# Patient Record
Sex: FEMALE | Race: WHITE | NOT HISPANIC OR LATINO
[De-identification: names, ages, dates, MRNs, and addresses within clinical notes are randomized per-mention and may not be internally consistent; named-entity substitution may affect disease eponyms.]

---

## 2017-06-08 ENCOUNTER — MEDICATION RENEWAL (OUTPATIENT)
Age: 42
End: 2017-06-08

## 2017-09-15 ENCOUNTER — MEDICATION RENEWAL (OUTPATIENT)
Age: 42
End: 2017-09-15

## 2017-11-02 ENCOUNTER — APPOINTMENT (OUTPATIENT)
Dept: OBGYN | Facility: CLINIC | Age: 42
End: 2017-11-02

## 2017-11-27 ENCOUNTER — RX RENEWAL (OUTPATIENT)
Age: 42
End: 2017-11-27

## 2018-01-03 ENCOUNTER — APPOINTMENT (OUTPATIENT)
Dept: OBGYN | Facility: CLINIC | Age: 43
End: 2018-01-03
Payer: COMMERCIAL

## 2018-01-03 VITALS
SYSTOLIC BLOOD PRESSURE: 110 MMHG | HEIGHT: 62 IN | WEIGHT: 146 LBS | DIASTOLIC BLOOD PRESSURE: 70 MMHG | BODY MASS INDEX: 26.87 KG/M2

## 2018-01-03 PROCEDURE — 99396 PREV VISIT EST AGE 40-64: CPT

## 2018-01-04 ENCOUNTER — MOBILE ON CALL (OUTPATIENT)
Age: 43
End: 2018-01-04

## 2018-01-10 LAB — CYTOLOGY CVX/VAG DOC THIN PREP: NORMAL

## 2019-02-21 ENCOUNTER — RX RENEWAL (OUTPATIENT)
Age: 44
End: 2019-02-21

## 2019-03-20 ENCOUNTER — APPOINTMENT (OUTPATIENT)
Dept: OBGYN | Facility: CLINIC | Age: 44
End: 2019-03-20
Payer: COMMERCIAL

## 2019-03-20 VITALS
SYSTOLIC BLOOD PRESSURE: 110 MMHG | BODY MASS INDEX: 27.97 KG/M2 | DIASTOLIC BLOOD PRESSURE: 66 MMHG | HEIGHT: 62 IN | WEIGHT: 152 LBS

## 2019-03-20 PROCEDURE — 99396 PREV VISIT EST AGE 40-64: CPT

## 2019-03-20 NOTE — PHYSICAL EXAM
[Awake] : awake [Alert] : alert [Acute Distress] : no acute distress [LAD] : no lymphadenopathy [Thyroid Nodule] : no thyroid nodule [Goiter] : no goiter [Mass] : no breast mass [Nipple Discharge] : no nipple discharge [Axillary LAD] : no axillary lymphadenopathy [Soft] : soft [Oriented x3] : oriented to person, place, and time [Tender] : non tender [Normal] : uterus [No Bleeding] : there was no active vaginal bleeding [Uterine Adnexae] : were not tender and not enlarged [RRR, No Murmurs] : RRR, no murmurs [CTAB] : CTAB

## 2019-03-20 NOTE — PROCEDURE
[Cervical Pap Smear] : cervical Pap smear [Tolerated Well] : the patient tolerated the procedure well [Liquid Base] : liquid base [No Complications] : there were no complications

## 2019-03-25 LAB — CYTOLOGY CVX/VAG DOC THIN PREP: NORMAL

## 2019-05-06 ENCOUNTER — RX RENEWAL (OUTPATIENT)
Age: 44
End: 2019-05-06

## 2019-06-27 ENCOUNTER — MEDICATION RENEWAL (OUTPATIENT)
Age: 44
End: 2019-06-27

## 2019-07-22 ENCOUNTER — RX RENEWAL (OUTPATIENT)
Age: 44
End: 2019-07-22

## 2019-09-16 ENCOUNTER — RX RENEWAL (OUTPATIENT)
Age: 44
End: 2019-09-16

## 2019-10-21 ENCOUNTER — MEDICATION RENEWAL (OUTPATIENT)
Age: 44
End: 2019-10-21

## 2019-12-09 ENCOUNTER — RX RENEWAL (OUTPATIENT)
Age: 44
End: 2019-12-09

## 2020-02-20 ENCOUNTER — RX RENEWAL (OUTPATIENT)
Age: 45
End: 2020-02-20

## 2020-09-17 ENCOUNTER — APPOINTMENT (OUTPATIENT)
Dept: OBGYN | Facility: CLINIC | Age: 45
End: 2020-09-17
Payer: COMMERCIAL

## 2020-09-17 VITALS
BODY MASS INDEX: 28.34 KG/M2 | HEIGHT: 62 IN | TEMPERATURE: 98 F | SYSTOLIC BLOOD PRESSURE: 120 MMHG | WEIGHT: 154 LBS | DIASTOLIC BLOOD PRESSURE: 70 MMHG

## 2020-09-17 DIAGNOSIS — Z01.419 ENCOUNTER FOR GYNECOLOGICAL EXAMINATION (GENERAL) (ROUTINE) W/OUT ABNORMAL FINDINGS: ICD-10-CM

## 2020-09-17 PROCEDURE — 99396 PREV VISIT EST AGE 40-64: CPT

## 2020-09-17 NOTE — HISTORY OF PRESENT ILLNESS
[FreeTextEntry1] : patient presents today for routine annual exam.\par  [TextBox_4] : no complaints  [Mammogramdate] : 8/2020 [BreastSonogramDate] : 8/2020 [PapSmeardate] : 3/2019

## 2020-09-23 LAB — CYTOLOGY CVX/VAG DOC THIN PREP: NORMAL

## 2020-10-20 ENCOUNTER — NON-APPOINTMENT (OUTPATIENT)
Age: 45
End: 2020-10-20

## 2020-10-28 ENCOUNTER — APPOINTMENT (OUTPATIENT)
Dept: OBGYN | Facility: CLINIC | Age: 45
End: 2020-10-28
Payer: COMMERCIAL

## 2020-10-28 DIAGNOSIS — Z13.79 ENCOUNTER FOR OTHER SCREENING FOR GENETIC AND CHROMOSOMAL ANOMALIES: ICD-10-CM

## 2020-10-28 PROCEDURE — 99211 OFF/OP EST MAY X REQ PHY/QHP: CPT

## 2020-10-28 PROCEDURE — 99072 ADDL SUPL MATRL&STAF TM PHE: CPT

## 2021-02-13 ENCOUNTER — TRANSCRIPTION ENCOUNTER (OUTPATIENT)
Age: 46
End: 2021-02-13

## 2021-09-15 ENCOUNTER — RX RENEWAL (OUTPATIENT)
Age: 46
End: 2021-09-15

## 2021-09-19 ENCOUNTER — TRANSCRIPTION ENCOUNTER (OUTPATIENT)
Age: 46
End: 2021-09-19

## 2021-09-24 ENCOUNTER — TRANSCRIPTION ENCOUNTER (OUTPATIENT)
Age: 46
End: 2021-09-24

## 2021-09-30 ENCOUNTER — APPOINTMENT (OUTPATIENT)
Dept: OBGYN | Facility: CLINIC | Age: 46
End: 2021-09-30
Payer: COMMERCIAL

## 2021-09-30 VITALS
WEIGHT: 155 LBS | SYSTOLIC BLOOD PRESSURE: 108 MMHG | BODY MASS INDEX: 28.52 KG/M2 | HEIGHT: 62 IN | DIASTOLIC BLOOD PRESSURE: 66 MMHG

## 2021-09-30 PROCEDURE — 99396 PREV VISIT EST AGE 40-64: CPT

## 2021-09-30 NOTE — HISTORY OF PRESENT ILLNESS
[FreeTextEntry1] : patient presents today for routine annual exam.\par  [TextBox_4] : no complaints  [Mammogramdate] : 9/2021 [facility]  [PapSmeardate] : 9/2020 [LMPDate] : 9/27/2021

## 2021-09-30 NOTE — REVIEW OF SYSTEMS
[Breast Lump] : breast lump [Headache] : headache [Sleep Disturbances] : sleep disturbances [Negative] : Heme/Lymph

## 2021-10-01 ENCOUNTER — TRANSCRIPTION ENCOUNTER (OUTPATIENT)
Age: 46
End: 2021-10-01

## 2021-10-06 LAB — CYTOLOGY CVX/VAG DOC THIN PREP: NORMAL

## 2021-10-08 ENCOUNTER — TRANSCRIPTION ENCOUNTER (OUTPATIENT)
Age: 46
End: 2021-10-08

## 2021-10-14 ENCOUNTER — TRANSCRIPTION ENCOUNTER (OUTPATIENT)
Age: 46
End: 2021-10-14

## 2021-10-20 ENCOUNTER — TRANSCRIPTION ENCOUNTER (OUTPATIENT)
Age: 46
End: 2021-10-20

## 2021-10-27 ENCOUNTER — TRANSCRIPTION ENCOUNTER (OUTPATIENT)
Age: 46
End: 2021-10-27

## 2021-11-01 ENCOUNTER — TRANSCRIPTION ENCOUNTER (OUTPATIENT)
Age: 46
End: 2021-11-01

## 2021-12-15 ENCOUNTER — RX RENEWAL (OUTPATIENT)
Age: 46
End: 2021-12-15

## 2023-07-03 ENCOUNTER — APPOINTMENT (OUTPATIENT)
Dept: OBGYN | Facility: CLINIC | Age: 48
End: 2023-07-03
Payer: COMMERCIAL

## 2023-07-03 VITALS
DIASTOLIC BLOOD PRESSURE: 70 MMHG | HEIGHT: 62 IN | WEIGHT: 144 LBS | SYSTOLIC BLOOD PRESSURE: 108 MMHG | BODY MASS INDEX: 26.5 KG/M2

## 2023-07-03 DIAGNOSIS — Z80.3 FAMILY HISTORY OF MALIGNANT NEOPLASM OF BREAST: ICD-10-CM

## 2023-07-03 DIAGNOSIS — Z01.419 ENCOUNTER FOR GYNECOLOGICAL EXAMINATION (GENERAL) (ROUTINE) W/OUT ABNORMAL FINDINGS: ICD-10-CM

## 2023-07-03 PROCEDURE — 99396 PREV VISIT EST AGE 40-64: CPT

## 2023-07-03 RX ORDER — NORETHINDRONE AND ETHINYL ESTRADIOL 0.4-0.035
0.4-35 KIT ORAL
Qty: 1 | Refills: 3 | Status: DISCONTINUED | COMMUNITY
Start: 2017-06-08 | End: 2023-07-03

## 2023-07-03 RX ORDER — NORETHINDRONE ACETATE AND ETHINYL ESTRADIOL AND FERROUS FUMARATE 1.5-30(21)
1.5-3 KIT ORAL
Qty: 1 | Refills: 1 | Status: DISCONTINUED | COMMUNITY
Start: 2019-06-27 | End: 2023-07-03

## 2023-07-03 RX ORDER — NORETHINDRONE ACETATE AND ETHINYL ESTRADIOL AND FERROUS FUMARATE 1.5-30(21)
1.5-3 KIT ORAL
Qty: 3 | Refills: 1 | Status: DISCONTINUED | COMMUNITY
Start: 2019-06-27 | End: 2023-07-03

## 2023-07-03 RX ORDER — NORETHINDRONE ACETATE AND ETHINYL ESTRADIOL AND FERROUS FUMARATE 1.5-30(21)
1.5-3 KIT ORAL
Qty: 28 | Refills: 11 | Status: DISCONTINUED | COMMUNITY
Start: 2020-02-20 | End: 2023-07-03

## 2023-07-03 RX ORDER — NORTRIPTYLINE HYDROCHLORIDE 10 MG/1
10 CAPSULE ORAL
Refills: 0 | Status: DISCONTINUED | COMMUNITY
End: 2023-07-03

## 2023-07-03 RX ORDER — SUMATRIPTAN 50 MG/1
50 TABLET, FILM COATED ORAL
Refills: 0 | Status: DISCONTINUED | COMMUNITY
End: 2023-07-03

## 2023-07-07 PROBLEM — Z80.3 FAMILY HISTORY OF MALIGNANT NEOPLASM OF BREAST: Status: ACTIVE | Noted: 2023-07-03

## 2023-08-14 ENCOUNTER — APPOINTMENT (OUTPATIENT)
Dept: OBGYN | Facility: CLINIC | Age: 48
End: 2023-08-14

## 2023-08-14 DIAGNOSIS — Z30.9 ENCOUNTER FOR CONTRACEPTIVE MANAGEMENT, UNSPECIFIED: ICD-10-CM

## 2023-08-15 ENCOUNTER — APPOINTMENT (OUTPATIENT)
Dept: OBGYN | Facility: CLINIC | Age: 48
End: 2023-08-15
Payer: COMMERCIAL

## 2023-08-15 VITALS
DIASTOLIC BLOOD PRESSURE: 60 MMHG | BODY MASS INDEX: 25.76 KG/M2 | SYSTOLIC BLOOD PRESSURE: 118 MMHG | HEIGHT: 62 IN | WEIGHT: 140 LBS

## 2023-08-15 DIAGNOSIS — Z30.430 ENCOUNTER FOR INSERTION OF INTRAUTERINE CONTRACEPTIVE DEVICE: ICD-10-CM

## 2023-08-15 DIAGNOSIS — N92.0 EXCESSIVE AND FREQUENT MENSTRUATION WITH REGULAR CYCLE: ICD-10-CM

## 2023-08-15 PROCEDURE — 76830 TRANSVAGINAL US NON-OB: CPT

## 2023-08-15 PROCEDURE — 58300 INSERT INTRAUTERINE DEVICE: CPT

## 2023-08-15 NOTE — PLAN
[FreeTextEntry1] : Transvaginal pelvic sonogram performed prior to and after insertion of IUD confirming proper placement.

## 2023-08-15 NOTE — PROCEDURE
[IUD Placement] : intrauterine device (IUD) placement [Consent Obtained] : Consent obtained [Prevention of Pregnancy] : prevention of pregnancy [Betadine] : Betadine [Tenaculum] : Tenaculum [Required Dilation] : required dilation [Mirena IUD] : Mirena IUD [Tolerated Well] : Patient tolerated the procedure well [No Complications] : No complications [de-identified] : Menorrhagia  [de-identified] : SN: [id number] [de-identified] : [id number] [de-identified] : 5/25

## 2023-08-21 PROBLEM — Z30.430 ENCOUNTER FOR INSERTION OF MIRENA IUD: Status: ACTIVE | Noted: 2023-08-21

## 2023-08-21 PROBLEM — N92.0 MENORRHAGIA WITH REGULAR CYCLE: Status: ACTIVE | Noted: 2023-08-21

## 2024-04-27 ENCOUNTER — APPOINTMENT (OUTPATIENT)
Dept: ORTHOPEDIC SURGERY | Facility: CLINIC | Age: 49
End: 2024-04-27
Payer: COMMERCIAL

## 2024-04-27 VITALS — WEIGHT: 160 LBS | BODY MASS INDEX: 29.44 KG/M2 | HEIGHT: 62 IN

## 2024-04-27 DIAGNOSIS — S93.491A SPRAIN OF OTHER LIGAMENT OF RIGHT ANKLE, INITIAL ENCOUNTER: ICD-10-CM

## 2024-04-27 PROCEDURE — 99203 OFFICE O/P NEW LOW 30 MIN: CPT

## 2024-04-27 NOTE — PHYSICAL EXAM
[de-identified] : RIGHT ankle range of motion dorsiflexion 20 degrees, plantarflexion 40 degrees, inversion 20 degrees, eversion 15 degrees. pain with inversion. No tenderness about the medial or lateral malleolus. Ankle joint line is non tender. ATFL, CFL are TENDER. Syndesmoses is nontender with negative squeeze test. Achilles, peroneal, posterior tibialis tendons are nontender. Negative anterior drawer. Tarsals are non tender. Metatarsals are non tender. TMT, MTPs non tender. Phalanges are non tender.  Strength is 4+/5 in all planes. Patient is able to do a single heel raise with pain.  Normal alignment. No pes planus or ankle valgus.  Skin is intact without rashes, erythema, warmth. Sensation is intact to light touch. 2+ DP and PT pulses. Normal reflexes.

## 2024-04-27 NOTE — ASSESSMENT
[FreeTextEntry1] : 3 views of the right foot with no acute fracture or dislocation. discussed these are not dedicated ankle views, but medial and lateral mal appears without fracture on limited viewing.

## 2024-04-27 NOTE — HISTORY OF PRESENT ILLNESS
[6] : 6 [4] : 4 [Dull/Aching] : dull/aching [Localized] : localized [Sharp] : sharp [Throbbing] : throbbing [Standing] : standing [Walking] : walking [de-identified] : This is a 48 year old female with right ankle pain after she rolled the ankle on 4/25/24. She was seen at [facility] and wrapped in a ace wrap. pain with ambulation. no prior injury. [] : no [FreeTextEntry1] : Right ankle  [FreeTextEntry3] : 4/25/2024 [FreeTextEntry5] : Pt was walking around the car and rolled her ankle, Pt went to [facility] the [facility], wally WILSON with her  [FreeTextEntry7] : Bottom of her foot  [de-identified] : Movement  [de-identified] : 4/25/2024 [de-identified] : [facility] [de-identified] : X-ray

## 2024-04-27 NOTE — DISCUSSION/SUMMARY
[de-identified] : Ms. HILARIO STEWARD is a 48 year year old female  diagnosed with a right ankle sprain. Diagnosis was discussed and treatment options were reviewed.   She was placed in a tall CAM walker boot and can be WBAT. The importance for rest, ice and elevation was discussed. Over the counter NSAIDs can be used as needed. Physical therapy was prescribed, but she declined. She was provided with a handout on exercises.   She will follow up with a foot/ankle specialist in 2-3 weeks.   All of  her questions were answered.  She understands and agree.

## 2024-11-26 ENCOUNTER — APPOINTMENT (OUTPATIENT)
Dept: OBGYN | Facility: CLINIC | Age: 49
End: 2024-11-26
Payer: COMMERCIAL

## 2024-11-26 VITALS
SYSTOLIC BLOOD PRESSURE: 110 MMHG | BODY MASS INDEX: 30.91 KG/M2 | HEIGHT: 62 IN | WEIGHT: 168 LBS | DIASTOLIC BLOOD PRESSURE: 60 MMHG

## 2024-11-26 DIAGNOSIS — Z80.41 FAMILY HISTORY OF MALIGNANT NEOPLASM OF OVARY: ICD-10-CM

## 2024-11-26 DIAGNOSIS — Z01.419 ENCOUNTER FOR GYNECOLOGICAL EXAMINATION (GENERAL) (ROUTINE) W/OUT ABNORMAL FINDINGS: ICD-10-CM

## 2024-11-26 PROCEDURE — 99396 PREV VISIT EST AGE 40-64: CPT

## 2024-11-30 LAB — CYTOLOGY CVX/VAG DOC THIN PREP: ABNORMAL

## 2024-12-01 PROBLEM — Z80.41 FAMILY HISTORY OF MALIGNANT NEOPLASM OF OVARY: Status: ACTIVE | Noted: 2024-11-26

## 2024-12-01 RX ORDER — FLUOXETINE HYDROCHLORIDE 60 MG/1
TABLET ORAL
Refills: 0 | Status: ACTIVE | COMMUNITY